# Patient Record
Sex: MALE | Race: WHITE
[De-identification: names, ages, dates, MRNs, and addresses within clinical notes are randomized per-mention and may not be internally consistent; named-entity substitution may affect disease eponyms.]

---

## 2022-06-21 ENCOUNTER — HOSPITAL ENCOUNTER (OUTPATIENT)
Dept: HOSPITAL 76 - DI.S | Age: 72
Discharge: HOME | End: 2022-06-21
Attending: EMERGENCY MEDICINE
Payer: MEDICARE

## 2022-06-21 DIAGNOSIS — S62.637B: Primary | ICD-10-CM

## 2022-06-22 NOTE — XRAY REPORT
PROCEDURE:  Finger(s) LT

 

INDICATIONS:  LACERATION OF LEFT LITTLE FINGER

 

TECHNIQUE:  AP hand, 2 views of the fifth finger(s) acquired.  

 

COMPARISON:  None

 

FINDINGS:  

 

Bones: Comminuted and minimally displaced fracture through fifth distal phalangeal tuft is seen. No o
ther fracture or dislocation. 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
een more prominent involving second through fifth the IP joints. No suspicious bony lesions.  

 

Soft tissues: Soft tissue defect over tip of fifth digit is seen. No radiopaque foreign body is noted
.

 

IMPRESSION:  

Laceration involving distal portion of finger with comminuted and minimally displaced fracture involv
ing fifth distal phalangeal tuft.

 

Reviewed by: Akbar Coronel MD on 6/22/2022 9:47 AM PDT

Approved by: Akbar Coronel MD on 6/22/2022 9:47 AM PDT

 

 

Station ID:  [id number]